# Patient Record
Sex: MALE | Race: WHITE | Employment: STUDENT | URBAN - METROPOLITAN AREA
[De-identification: names, ages, dates, MRNs, and addresses within clinical notes are randomized per-mention and may not be internally consistent; named-entity substitution may affect disease eponyms.]

---

## 2017-01-09 ENCOUNTER — OFFICE VISIT (OUTPATIENT)
Dept: FAMILY MEDICINE CLINIC | Facility: CLINIC | Age: 22
End: 2017-01-09

## 2017-01-09 ENCOUNTER — HOSPITAL ENCOUNTER (OUTPATIENT)
Dept: GENERAL RADIOLOGY | Age: 22
Discharge: HOME | End: 2017-01-09
Attending: PHYSICIAN ASSISTANT
Payer: COMMERCIAL

## 2017-01-09 ENCOUNTER — LAB ENCOUNTER (OUTPATIENT)
Dept: LAB | Age: 22
End: 2017-01-09
Attending: PHYSICIAN ASSISTANT
Payer: COMMERCIAL

## 2017-01-09 VITALS
OXYGEN SATURATION: 98 % | HEIGHT: 77 IN | HEART RATE: 90 BPM | WEIGHT: 232 LBS | TEMPERATURE: 100 F | SYSTOLIC BLOOD PRESSURE: 124 MMHG | BODY MASS INDEX: 27.39 KG/M2 | DIASTOLIC BLOOD PRESSURE: 80 MMHG | RESPIRATION RATE: 22 BRPM

## 2017-01-09 DIAGNOSIS — R07.89 ATYPICAL CHEST PAIN: ICD-10-CM

## 2017-01-09 DIAGNOSIS — M79.10 MYALGIA: ICD-10-CM

## 2017-01-09 DIAGNOSIS — R07.89 ATYPICAL CHEST PAIN: Primary | ICD-10-CM

## 2017-01-09 LAB
ALBUMIN SERPL-MCNC: 4.3 G/DL (ref 3.5–4.8)
ALP LIVER SERPL-CCNC: 74 U/L (ref 45–117)
ALT SERPL-CCNC: 41 U/L (ref 17–63)
AST SERPL-CCNC: 21 U/L (ref 15–41)
BASOPHILS # BLD AUTO: 0.04 X10(3) UL (ref 0–0.1)
BASOPHILS NFR BLD AUTO: 0.6 %
BILIRUB SERPL-MCNC: 0.8 MG/DL (ref 0.1–2)
BUN BLD-MCNC: 10 MG/DL (ref 8–20)
C-REACTIVE PROTEIN: <0.29 MG/DL (ref ?–1)
CALCIUM BLD-MCNC: 9.6 MG/DL (ref 8.3–10.3)
CHLORIDE: 105 MMOL/L (ref 101–111)
CO2: 28 MMOL/L (ref 22–32)
CREAT BLD-MCNC: 0.96 MG/DL (ref 0.7–1.3)
EOSINOPHIL # BLD AUTO: 0.04 X10(3) UL (ref 0–0.3)
EOSINOPHIL NFR BLD AUTO: 0.6 %
ERYTHROCYTE [DISTWIDTH] IN BLOOD BY AUTOMATED COUNT: 12.3 % (ref 11.5–16)
GLUCOSE BLD-MCNC: 91 MG/DL (ref 70–99)
HCT VFR BLD AUTO: 46.4 % (ref 37–53)
HGB BLD-MCNC: 16.3 G/DL (ref 13–17)
IMMATURE GRANULOCYTE COUNT: 0.02 X10(3) UL (ref 0–1)
IMMATURE GRANULOCYTE RATIO %: 0.3 %
LYMPHOCYTES # BLD AUTO: 1.04 X10(3) UL (ref 0.9–4)
LYMPHOCYTES NFR BLD AUTO: 16 %
M PROTEIN MFR SERPL ELPH: 7.9 G/DL (ref 6.1–8.3)
MCH RBC QN AUTO: 29.7 PG (ref 27–33.2)
MCHC RBC AUTO-ENTMCNC: 35.1 G/DL (ref 31–37)
MCV RBC AUTO: 84.5 FL (ref 80–99)
MONOCYTES # BLD AUTO: 0.51 X10(3) UL (ref 0.1–0.6)
MONOCYTES NFR BLD AUTO: 7.9 %
NEUTROPHIL ABS PRELIM: 4.83 X10 (3) UL (ref 1.3–6.7)
NEUTROPHILS # BLD AUTO: 4.83 X10(3) UL (ref 1.3–6.7)
NEUTROPHILS NFR BLD AUTO: 74.6 %
PLATELET # BLD AUTO: 359 10(3)UL (ref 150–450)
POTASSIUM SERPL-SCNC: 3.9 MMOL/L (ref 3.6–5.1)
RBC # BLD AUTO: 5.49 X10(6)UL (ref 4.3–5.7)
RED CELL DISTRIBUTION WIDTH-SD: 37.2 FL (ref 35.1–46.3)
SED RATE-ML: 8 MM/HR (ref 0–12)
SODIUM SERPL-SCNC: 138 MMOL/L (ref 136–144)
TSI SER-ACNC: 0.83 MIU/ML (ref 0.35–5.5)
WBC # BLD AUTO: 6.5 X10(3) UL (ref 4–13)

## 2017-01-09 PROCEDURE — 71020 XR CHEST PA + LAT CHEST (CPT=71020): CPT

## 2017-01-09 PROCEDURE — 80053 COMPREHEN METABOLIC PANEL: CPT

## 2017-01-09 PROCEDURE — 99214 OFFICE O/P EST MOD 30 MIN: CPT | Performed by: PHYSICIAN ASSISTANT

## 2017-01-09 PROCEDURE — 36415 COLL VENOUS BLD VENIPUNCTURE: CPT

## 2017-01-09 PROCEDURE — 93000 ELECTROCARDIOGRAM COMPLETE: CPT | Performed by: PHYSICIAN ASSISTANT

## 2017-01-09 PROCEDURE — 85025 COMPLETE CBC W/AUTO DIFF WBC: CPT

## 2017-01-09 PROCEDURE — 86038 ANTINUCLEAR ANTIBODIES: CPT

## 2017-01-09 PROCEDURE — 84443 ASSAY THYROID STIM HORMONE: CPT

## 2017-01-09 PROCEDURE — 86140 C-REACTIVE PROTEIN: CPT

## 2017-01-09 PROCEDURE — 85652 RBC SED RATE AUTOMATED: CPT

## 2017-01-09 NOTE — PROGRESS NOTES
HPI:   Danny Bright is a 24year old male who presents for sinus symptoms for  1  weeks. Patient reports sore throat only at the beginning of sx's, congestion, post nasal drip, denies fever, denies cough, denies sinus pain.  Does admit to occasional c posterior pharynx clear and normal, no sinus tenderness  NECK: supple,no adenopathy  LUNGS: CTA, easy breathing, no cough  CV: normal S1S2, RRR without murmur  CHEST: +reproducible chest tenderness bilateral costochondral junctions  MS: FROM all extremitie

## 2017-01-11 DIAGNOSIS — I51.7 LEFT ATRIAL ENLARGEMENT: Primary | ICD-10-CM

## 2017-01-11 LAB — ANA SCREEN: NEGATIVE

## 2017-01-26 ENCOUNTER — HOSPITAL ENCOUNTER (OUTPATIENT)
Dept: CV DIAGNOSTICS | Facility: HOSPITAL | Age: 22
Discharge: HOME | End: 2017-01-26
Attending: PHYSICIAN ASSISTANT
Payer: COMMERCIAL

## 2017-01-26 DIAGNOSIS — I51.7 LEFT ATRIAL ENLARGEMENT: ICD-10-CM

## 2017-01-26 PROCEDURE — 93306 TTE W/DOPPLER COMPLETE: CPT | Performed by: INTERNAL MEDICINE

## 2017-01-26 PROCEDURE — 93306 TTE W/DOPPLER COMPLETE: CPT

## 2017-05-12 ENCOUNTER — OFFICE VISIT (OUTPATIENT)
Dept: FAMILY MEDICINE CLINIC | Facility: CLINIC | Age: 22
End: 2017-05-12

## 2017-05-12 VITALS
BODY MASS INDEX: 15.47 KG/M2 | WEIGHT: 131 LBS | SYSTOLIC BLOOD PRESSURE: 120 MMHG | RESPIRATION RATE: 20 BRPM | TEMPERATURE: 98 F | DIASTOLIC BLOOD PRESSURE: 78 MMHG | HEIGHT: 77 IN | OXYGEN SATURATION: 98 % | HEART RATE: 85 BPM

## 2017-05-12 DIAGNOSIS — R07.89 COSTOCHONDRAL CHEST PAIN: Primary | ICD-10-CM

## 2017-05-12 PROCEDURE — 99213 OFFICE O/P EST LOW 20 MIN: CPT | Performed by: FAMILY MEDICINE

## 2017-05-12 RX ORDER — NABUMETONE 500 MG/1
500 TABLET, FILM COATED ORAL 2 TIMES DAILY
Qty: 60 TABLET | Refills: 0 | Status: SHIPPED | OUTPATIENT
Start: 2017-05-12 | End: 2018-03-03

## 2017-05-12 NOTE — PROGRESS NOTES
Saw Jerson Umana back in January for her chest pain. Had an echocardiogram due to his grandfather requiring a valve replacement at age 45.   This was normal.  Had a chest x-ray that was also normal.  Pain seemed to get better for a while and then came back about

## 2017-09-29 ENCOUNTER — HOSPITAL ENCOUNTER (OUTPATIENT)
Age: 22
Discharge: HOME | End: 2017-09-29
Attending: FAMILY MEDICINE
Payer: COMMERCIAL

## 2017-09-29 VITALS
TEMPERATURE: 98 F | RESPIRATION RATE: 18 BRPM | OXYGEN SATURATION: 98 % | SYSTOLIC BLOOD PRESSURE: 141 MMHG | DIASTOLIC BLOOD PRESSURE: 79 MMHG | HEART RATE: 81 BPM

## 2017-09-29 DIAGNOSIS — S00.412A ABRASION OF LEFT EAR CANAL, INITIAL ENCOUNTER: ICD-10-CM

## 2017-09-29 DIAGNOSIS — H61.22 IMPACTED CERUMEN OF LEFT EAR: Primary | ICD-10-CM

## 2017-09-29 PROCEDURE — 99213 OFFICE O/P EST LOW 20 MIN: CPT

## 2017-09-29 PROCEDURE — 69209 REMOVE IMPACTED EAR WAX UNI: CPT

## 2017-09-29 PROCEDURE — 99204 OFFICE O/P NEW MOD 45 MIN: CPT

## 2017-09-29 RX ORDER — NEOMYCIN SULFATE, POLYMYXIN B SULFATE AND HYDROCORTISONE 10; 3.5; 1 MG/ML; MG/ML; [USP'U]/ML
4 SUSPENSION/ DROPS AURICULAR (OTIC) 3 TIMES DAILY
Qty: 10 ML | Refills: 0 | Status: SHIPPED | OUTPATIENT
Start: 2017-09-29 | End: 2017-10-04

## 2017-09-29 NOTE — ED PROVIDER NOTES
Patient Seen in: [facility]    History   Patient presents with:  Ear Wax    Stated Complaint: Ears are plugged up    HPI    This 25-year-old male presents to the office with complaining of his left ear feeling plugged up.   He st cervical lymphadenopathy. No thyromegaly,  HEART: Regular rate and rhythm, no S3, S4 or murmur noted. LUNGS: Clear to ausculation. No retractions or tachypnea noted. SKIN: Warm and dry.       ED Course   Labs Reviewed - No data to display    =============

## 2018-02-02 ENCOUNTER — TELEPHONE (OUTPATIENT)
Dept: FAMILY MEDICINE CLINIC | Facility: CLINIC | Age: 23
End: 2018-02-02

## 2018-02-02 DIAGNOSIS — Z20.828 EXPOSURE TO THE FLU: Primary | ICD-10-CM

## 2018-02-02 RX ORDER — OSELTAMIVIR PHOSPHATE 75 MG/1
75 CAPSULE ORAL DAILY
Qty: 10 CAPSULE | Refills: 0 | Status: SHIPPED | OUTPATIENT
Start: 2018-02-02 | End: 2018-02-12

## 2018-03-03 ENCOUNTER — OFFICE VISIT (OUTPATIENT)
Dept: FAMILY MEDICINE CLINIC | Facility: CLINIC | Age: 23
End: 2018-03-03

## 2018-03-03 VITALS
OXYGEN SATURATION: 99 % | BODY MASS INDEX: 27.04 KG/M2 | WEIGHT: 229 LBS | RESPIRATION RATE: 18 BRPM | HEIGHT: 77 IN | TEMPERATURE: 99 F | HEART RATE: 70 BPM | SYSTOLIC BLOOD PRESSURE: 112 MMHG | DIASTOLIC BLOOD PRESSURE: 64 MMHG

## 2018-03-03 DIAGNOSIS — J06.9 ACUTE URI: Primary | ICD-10-CM

## 2018-03-03 PROCEDURE — 99213 OFFICE O/P EST LOW 20 MIN: CPT | Performed by: NURSE PRACTITIONER

## 2018-03-03 RX ORDER — AZITHROMYCIN 250 MG/1
TABLET, FILM COATED ORAL
Qty: 6 TABLET | Refills: 0 | Status: SHIPPED | OUTPATIENT
Start: 2018-03-03

## 2018-03-03 NOTE — PROGRESS NOTES
Patient presents with:  Cough: cough, sore throat, sinus congestoin, phlegm in throat, headache x3-4 days      HPI:   Katie Stover is a 25year old male who presents for upper respiratory symptoms for  4  days.  Patient reports sore throat, congestion necessary. I encouraged patient to continue with comfort care. Printed RX for Z-John provided, but encouraged not to start unless symptoms failing to improve within the next 5-7 days. Patient agrees to plan of care and verbalized understanding.       Meds

## 2022-09-21 ENCOUNTER — OFFICE VISIT (OUTPATIENT)
Dept: FAMILY MEDICINE CLINIC | Facility: CLINIC | Age: 27
End: 2022-09-21

## 2022-09-21 VITALS
HEART RATE: 106 BPM | SYSTOLIC BLOOD PRESSURE: 128 MMHG | DIASTOLIC BLOOD PRESSURE: 82 MMHG | HEIGHT: 75.5 IN | BODY MASS INDEX: 32.24 KG/M2 | WEIGHT: 262 LBS | RESPIRATION RATE: 16 BRPM | OXYGEN SATURATION: 98 %

## 2022-09-21 DIAGNOSIS — K59.00 CONSTIPATION, UNSPECIFIED CONSTIPATION TYPE: Primary | ICD-10-CM

## 2022-09-21 PROCEDURE — 99213 OFFICE O/P EST LOW 20 MIN: CPT | Performed by: FAMILY MEDICINE

## 2022-09-21 RX ORDER — LACTULOSE 10 G/15ML
20 SOLUTION ORAL 3 TIMES DAILY
Qty: 473 ML | Refills: 1 | Status: SHIPPED | OUTPATIENT
Start: 2022-09-21

## 2022-09-26 ENCOUNTER — HOSPITAL ENCOUNTER (OUTPATIENT)
Dept: GENERAL RADIOLOGY | Facility: HOSPITAL | Age: 27
Discharge: HOME | End: 2022-09-26
Attending: FAMILY MEDICINE

## 2022-09-26 ENCOUNTER — LAB ENCOUNTER (OUTPATIENT)
Dept: LAB | Facility: HOSPITAL | Age: 27
End: 2022-09-26
Attending: FAMILY MEDICINE

## 2022-09-26 ENCOUNTER — TELEPHONE (OUTPATIENT)
Dept: FAMILY MEDICINE CLINIC | Facility: CLINIC | Age: 27
End: 2022-09-26

## 2022-09-26 ENCOUNTER — OFFICE VISIT (OUTPATIENT)
Dept: FAMILY MEDICINE CLINIC | Facility: CLINIC | Age: 27
End: 2022-09-26

## 2022-09-26 VITALS
DIASTOLIC BLOOD PRESSURE: 82 MMHG | BODY MASS INDEX: 31.99 KG/M2 | HEIGHT: 75.5 IN | OXYGEN SATURATION: 98 % | RESPIRATION RATE: 16 BRPM | WEIGHT: 260 LBS | SYSTOLIC BLOOD PRESSURE: 132 MMHG | HEART RATE: 104 BPM

## 2022-09-26 DIAGNOSIS — R10.9 ABDOMINAL DISCOMFORT: ICD-10-CM

## 2022-09-26 DIAGNOSIS — R68.2 DRY MOUTH: ICD-10-CM

## 2022-09-26 DIAGNOSIS — K59.00 CONSTIPATION, UNSPECIFIED CONSTIPATION TYPE: ICD-10-CM

## 2022-09-26 DIAGNOSIS — K59.00 CONSTIPATION, UNSPECIFIED CONSTIPATION TYPE: Primary | ICD-10-CM

## 2022-09-26 LAB
ALBUMIN SERPL-MCNC: 4.4 G/DL (ref 3.4–5)
ALBUMIN/GLOB SERPL: 1.1 {RATIO} (ref 1–2)
ALP LIVER SERPL-CCNC: 101 U/L
ALT SERPL-CCNC: 46 U/L
ANION GAP SERPL CALC-SCNC: 10 MMOL/L (ref 0–18)
AST SERPL-CCNC: 30 U/L (ref 15–37)
BASOPHILS # BLD AUTO: 0.03 X10(3) UL (ref 0–0.2)
BASOPHILS NFR BLD AUTO: 0.6 %
BILIRUB SERPL-MCNC: 1.2 MG/DL (ref 0.1–2)
BUN BLD-MCNC: 8 MG/DL (ref 7–18)
CALCIUM BLD-MCNC: 10.1 MG/DL (ref 8.5–10.1)
CHLORIDE SERPL-SCNC: 102 MMOL/L (ref 98–112)
CO2 SERPL-SCNC: 24 MMOL/L (ref 21–32)
CREAT BLD-MCNC: 0.98 MG/DL
EOSINOPHIL # BLD AUTO: 0.04 X10(3) UL (ref 0–0.7)
EOSINOPHIL NFR BLD AUTO: 0.8 %
ERYTHROCYTE [DISTWIDTH] IN BLOOD BY AUTOMATED COUNT: 12.9 %
EST. AVERAGE GLUCOSE BLD GHB EST-MCNC: 94 MG/DL (ref 68–126)
FASTING STATUS PATIENT QL REPORTED: YES
GFR SERPLBLD BASED ON 1.73 SQ M-ARVRAT: 108 ML/MIN/1.73M2 (ref 60–?)
GLOBULIN PLAS-MCNC: 3.9 G/DL (ref 2.8–4.4)
GLUCOSE BLD-MCNC: 88 MG/DL (ref 70–99)
HBA1C MFR BLD: 4.9 % (ref ?–5.7)
HCT VFR BLD AUTO: 45.1 %
HGB BLD-MCNC: 15.7 G/DL
IMM GRANULOCYTES # BLD AUTO: 0.01 X10(3) UL (ref 0–1)
IMM GRANULOCYTES NFR BLD: 0.2 %
LYMPHOCYTES # BLD AUTO: 1.09 X10(3) UL (ref 1–4)
LYMPHOCYTES NFR BLD AUTO: 20.8 %
MCH RBC QN AUTO: 28.2 PG (ref 26–34)
MCHC RBC AUTO-ENTMCNC: 34.8 G/DL (ref 31–37)
MCV RBC AUTO: 81.1 FL
MONOCYTES # BLD AUTO: 0.49 X10(3) UL (ref 0.1–1)
MONOCYTES NFR BLD AUTO: 9.3 %
NEUTROPHILS # BLD AUTO: 3.59 X10 (3) UL (ref 1.5–7.7)
NEUTROPHILS # BLD AUTO: 3.59 X10(3) UL (ref 1.5–7.7)
NEUTROPHILS NFR BLD AUTO: 68.3 %
OSMOLALITY SERPL CALC.SUM OF ELEC: 280 MOSM/KG (ref 275–295)
PLATELET # BLD AUTO: 376 10(3)UL (ref 150–450)
POTASSIUM SERPL-SCNC: 3.8 MMOL/L (ref 3.5–5.1)
PROT SERPL-MCNC: 8.3 G/DL (ref 6.4–8.2)
RBC # BLD AUTO: 5.56 X10(6)UL
SODIUM SERPL-SCNC: 136 MMOL/L (ref 136–145)
WBC # BLD AUTO: 5.3 X10(3) UL (ref 4–11)

## 2022-09-26 PROCEDURE — 99213 OFFICE O/P EST LOW 20 MIN: CPT | Performed by: FAMILY MEDICINE

## 2022-09-26 PROCEDURE — 85025 COMPLETE CBC W/AUTO DIFF WBC: CPT

## 2022-09-26 PROCEDURE — 80053 COMPREHEN METABOLIC PANEL: CPT

## 2022-09-26 PROCEDURE — 83036 HEMOGLOBIN GLYCOSYLATED A1C: CPT

## 2022-09-26 PROCEDURE — 74019 RADEX ABDOMEN 2 VIEWS: CPT | Performed by: FAMILY MEDICINE

## 2022-09-26 PROCEDURE — 36415 COLL VENOUS BLD VENIPUNCTURE: CPT

## 2022-09-26 NOTE — TELEPHONE ENCOUNTER
WANTS DR FRANK TO KNOW HE TOOK PT OVER TO [facility] TO BE SEEN. THEY TOOK E X RAYS AND BLOOD SAMPLE. HE THOUGHT DR PARIS MAY WANT TO LOOK AT THE RESULTS. PLS CALL IF ANY Q'S OR THOUGHTS.

## 2022-09-26 NOTE — TELEPHONE ENCOUNTER
Thankfully labs, and xray look normal. I think the next step would be to see GI, in addition to continuing to take miralax daily. Labs and imagine are reassuring.     If he is up for it, we could consider trying a suppository based medication to see if that works better than the lactulose    Yi Green MD

## 2022-09-27 RX ORDER — LACTULOSE 10 G/15ML
20 SOLUTION ORAL 3 TIMES DAILY
Qty: 473 ML | Refills: 1 | Status: SHIPPED | OUTPATIENT
Start: 2022-09-27

## 2022-09-27 RX ORDER — BISACODYL 10 MG
10 SUPPOSITORY, RECTAL RECTAL DAILY
Qty: 3 SUPPOSITORY | Refills: 0 | Status: SHIPPED | OUTPATIENT
Start: 2022-09-27

## 2022-09-27 NOTE — TELEPHONE ENCOUNTER
Father informed. They are willing to try the suppository. They are out of the lactulose and would like a refill. Please advise. Pt will see GI information for [facility] given.

## 2025-03-27 NOTE — TELEPHONE ENCOUNTER
S/w pt    Reports palpitations x2 weeks, better at night and in am but occur during day    No CP but sts discomfort in ribs  No sob    Reports his HR gets a little high as he feels panicky when this happens. Thinks it runs around 100    No distress while speaking with me  Advised to keep apt as scheduled. We have not seen him prior since 2022    Advised if sx's worsen prior, any SOB/CP--go to ER    He voiced understanding

## 2025-03-28 NOTE — PROGRESS NOTES
[facility] Progress Note    SUBJECTIVE: Damien Mir 29 year old male is here today for   Chief Complaint   Patient presents with    Palpitations     Pt has had 2 panic attacks with in the last 48 hours. His 1st panic attack was 3 weeks ago.         Damien has been having panic attacks. Had fast heart rate, trouble sleeping. Sinc then having some palpitations, not painful or severe.    Has had two more panic attacks typically in the evening. 9 years ago seen by Dr. Glynn for something similar, had a negative echo.    Coming in for evaluation.    Even now some discomfort in right pectoral area, higher heart rate.    Started taking college classes again, and graduating in May. There are some family and friends issues he is stressed about. Has certain games he calls a bad habit, and they are stressful.    One current class, last one he needs.  Not currently working.    Used to see a therapist in high school.    Has had panic attacks every 2-3 months, now more recently        PMH  History reviewed. No pertinent past medical history.     PSH  History reviewed. No pertinent surgical history.     Social Hx:  No changes    ROS  See HPI    OBJECTIVE:  /86   Pulse 116   Resp 18   Ht 6' 3.5\" (1.918 m)   Wt 280 lb (127 kg)   SpO2 99%   BMI 34.54 kg/m²       CV: tachycardic, s1 and s2 present, no murmurs clicks or rubs  Resp: clear to auscultation bilaterally      Labs:          Meds:   Current Outpatient Medications   Medication Sig Dispense Refill    acidophilus-pectin Oral Cap Take 1 capsule by mouth daily.      FIBER OR Take by mouth.      sertraline 50 MG Oral Tab Take 1 tablet (50 mg total) by mouth daily. 30 tablet 1    ALPRAZolam 0.25 MG Oral Tab Take 1 tablet (0.25 mg total) by mouth every 6 (six) hours as needed. 15 tablet 1         Assessment/Plan  Damien was seen today for palpitations.    Diagnoses and all orders for this visit:    Palpitations  -     CBC W Differential W Platelet  [E]; Future  -     TSH W Reflex To Free T4 [E]; Future  -     CARD ZIO EXTENDED MONITOR 3-7 DAYS (CPT=93411/33600); Future  -     Basic Metabolic Panel (8) [E]; Future    Panic attack  -     sertraline 50 MG Oral Tab; Take 1 tablet (50 mg total) by mouth daily.  -     ALPRAZolam 0.25 MG Oral Tab; Take 1 tablet (0.25 mg total) by mouth every 6 (six) hours as needed.  -     [facility] Referral - In Network    Generalized anxiety disorder  -     [facility] Referral - In Network         Will check for causes of his symptoms, but likely anxiety, if work up negative will continue to push for good anxiety control         Total Time spent with patient and coordinating care:  15 minutes.    Follow up: as needed      Bethel Hernandez MD

## 2025-04-03 NOTE — TELEPHONE ENCOUNTER
Abner Quezada,    I'm glad that we were able to connect today. Here are some therapy resources that may be a good fit. Please verify insurance coverage ([facility]-Medicaid) with any providers that you may choose to call and schedule with directly. If you need further assistance, please give our office a call at [phone]. You may also contact the [facility] 24/7 helpline at [phone] for additional support.     [facility]  [address] , [address], [place], [address]  Phone: [phone]  [email or web address]    [facility]   [address] [place], [place], [address]  Phone: [phone]  [email or web address]    [facility]  [address], [place], [address]  Phone: [phone]  [email or web address]    [facility]  [address], [address], [place], [address]  Phone: [phone]  [email or web address]      Yari TOLENTINO LCSW (she/her/hers)  Patient Care Navigator - Mental Health  [facility]/Mental Health Division    [email or web address]  Request an assessment or support »

## (undated) NOTE — MR AVS SNAPSHOT
[address]  [address] [facility], [address] [id number] [phone]               Thank you for choosing us for your health care visit with Ian Montes De Oca.   We are glad to serve you and happy to provide you with this Instructions: To schedule an appointment for your radiology test please call [facility] Scheduling at [phone].          Referral Information     Referral Order Referred to Address [facility] Phone Visits Status Diagnosis           Atypical ches including white bread, rice and pasta   Eat plenty of protein, keep the fat content low Sugars:  sodas and sports drinks, candies and desserts   Eat plenty of low-fat dairy products High fat meats and dairy   Choose whole grain products Foods high in sodiu

## (undated) NOTE — MR AVS SNAPSHOT
[address]  [address] [facility], [address] [address] [phone]               Thank you for choosing us for your health care visit with Tej Aviles MD.  We are glad to serve you and happy to provide you with this your Zip Code and Date of Birth to complete the sign-up process. If you do not sign up before the expiration date, you must request a new code.     Your unique [employer] Access Code: [email or web address]  Expires: 7/11/2017  1:08 PM    If you have questions, you can c